# Patient Record
(demographics unavailable — no encounter records)

---

## 2025-03-26 NOTE — HEALTH RISK ASSESSMENT
[Very Good] : ~his/her~  mood as very good [Yes] : Yes [2 - 3 times a week (3 pts)] : 2 - 3  times a week (3 points) [1 or 2 (0 pts)] : 1 or 2 (0 points) [No falls in past year] : Patient reported no falls in the past year [Little interest or pleasure doing things] : 1) Little interest or pleasure doing things [Feeling down, depressed, or hopeless] : 2) Feeling down, depressed, or hopeless [0] : 2) Feeling down, depressed, or hopeless: Not at all (0) [PHQ-2 Negative - No further assessment needed] : PHQ-2 Negative - No further assessment needed [Time Spent: ___ Minutes] : I spent [unfilled] minutes performing a depression screening for this patient. [Former] : Former [> 15 Years] : > 15 Years [[id number]] : 0

## 2025-03-26 NOTE — HISTORY OF PRESENT ILLNESS
[de-identified] : 71 yr old w HTN, HLD, had CACS w her cardiologist Dr Bryant a year ago; here for CPE.  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  He is doing better, but short term memory is affected.  She walks regularly and Pilates 3 days a week, unlimited by CP sx.  She declines flu shots. has declined pnemovax for years. Had discussion again today. she will reconsider getting PCV 20.   4/3/2024. 70 yr old w HTN and HLD here in f/u. Back on statin and tolerating fine, needs labs. Seeing Dr Bryant, her cardiologist, who arranged for a coronary calc score today.  2/6/2024  70 yr old here in f/u HTN and HLD. Stopped statin for a while, not sure it that was what was causing her malaise and will now restart. Brings record of home BP all normal. High in office only. Had great brooke in [place] and [place]. Feels well.

## 2025-03-26 NOTE — HISTORY OF PRESENT ILLNESS
[de-identified] : 71 yr old w HTN, HLD, had CACS w her cardiologist Dr Bryant a year ago; here for CPE.  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  He is doing better, but short term memory is affected.  She walks regularly and Pilates 3 days a week, unlimited by CP sx.  She declines flu shots. has declined pnemovax for years. Had discussion again today. she will reconsider getting PCV 20.   4/3/2024. 70 yr old w HTN and HLD here in f/u. Back on statin and tolerating fine, needs labs. Seeing Dr Bryant, her cardiologist, who arranged for a coronary calc score today.  2/6/2024  70 yr old here in f/u HTN and HLD. Stopped statin for a while, not sure it that was what was causing her malaise and will now restart. Brings record of home BP all normal. High in office only. Had great brooke in [place] and [place]. Feels well.

## 2025-03-26 NOTE — ASSESSMENT
[FreeTextEntry1] : Patient provided with education on recommended exercise, proper diet, recommended age appropriate screening tests, vaccines, sexual health, smoking cessation and moderate alcohol intake.  STI screening offered including HIV/ Hep C check; proper use of seat belts, helmets on bicycles, also recommended.  Doing well  worried about her 's health  decrease wine intake  cont exercise/ Pilates  reconsider getting PCV 20.  f/u w cardiologist Dr Bryant annually mild pulm htn unlikely to be of consequence for her f/u 1y /prn

## 2025-03-26 NOTE — HEALTH RISK ASSESSMENT
[Very Good] : ~his/her~  mood as very good [Yes] : Yes [2 - 3 times a week (3 pts)] : 2 - 3  times a week (3 points) [1 or 2 (0 pts)] : 1 or 2 (0 points) [No falls in past year] : Patient reported no falls in the past year [Little interest or pleasure doing things] : 1) Little interest or pleasure doing things [Feeling down, depressed, or hopeless] : 2) Feeling down, depressed, or hopeless [0] : 2) Feeling down, depressed, or hopeless: Not at all (0) [PHQ-2 Negative - No further assessment needed] : PHQ-2 Negative - No further assessment needed [Time Spent: ___ Minutes] : I spent [unfilled] minutes performing a depression screening for this patient. [Former] : Former [> 15 Years] : > 15 Years [[id number]] : 0

## 2025-03-26 NOTE — ADDENDUM
[FreeTextEntry1] :  NOTE: CHARGE FOR VISIT AMENDED TO F/U VISIT LEVEL 5  45 MINUTES MEDICARE ANNUAL VISIT ALREADY BILLED BY ANOTHER PROVIDER.

## 2025-05-28 NOTE — END OF VISIT
[FreeTextEntry3] : Documented by Corine Neumann acting as a scribe for Dr. Marquez Benitez. 05/28/2025  All medical record entries made by the Scribe were at my, Dr. Marquez Benitez, direction and personally dictated by me on 05/28/2025. I have reviewed the chart and agree that the record accurately reflects my personal performance of the history, physical exam, assessment and plan. I have also personally directed, reviewed, and agreed with the chart.

## 2025-05-28 NOTE — DISCUSSION/SUMMARY
[de-identified] : IMP: 71-year-old female with bilateral hip osteoarthritis left worst than right; possible osteonecrosis, as well as suspected left lateral meniscus tear. - We will initiate a new course of onservative management with physical therapy, home exercise program, Tylenol, and topical diclofenac as needed.  - Patient expressed some reservations regarding oral anti-inflammatories, and so we will avoid these for the time being.  - Her hip symptoms have overall been stable and relatively mild.  - Her knee symptoms are only three weeks old and have been spontaneously improving on their own.  - We did review that further workup could be considered to consider a partial lateral meniscectomy, but only after a failure of initial conservative management; and overall, I doubt that she'll need an arthroscopy. - However, we will offer her follow-up in six to eight weeks with no new x-rays needed to review clinical progress.  - If she's had insufficient improvement in her knee symptoms by then, we may consider a left knee MRI.  - We did review that she would be a candidate for a left total hip replacement if her hip symptoms become more severe, which to date for the past several years they have not.

## 2025-05-28 NOTE — PHYSICAL EXAM
[de-identified] : General appearance: well nourished and hydrated, pleasant, alert and oriented x 3, cooperative.   HEENT: normocephalic, EOM intact, wearing mask, external auditory canal clear.   Cardiovascular: no lower leg edema, no varicosities, dorsalis pedis pulses palpable and symmetric.   Lymphatics: no palpable lymphadenopathy, no lymphedema.   Neurologic: sensation is normal, no muscle weakness in upper or lower extremities, patella tendon reflexes present and symmetric.   Dermatologic: skin moist, warm, no rash.   Spine: cervical spine with normal lordosis and painless range of motion, thoracic spine with normal kyphosis and painless range of motion, lumbosacral spine with normal lordosis and painless range of motion.  No tenderness to palpation along midline spine and paraspinal musculature.  Sacroiliac joints nontender bilaterally. Negative SLR and crossed SLR tests bilaterally. Gait: She presents with no assistive device, she transitions cautiously from seated to standing, and she demonstrates a cautious gait pattern. She does have some shoulder asymmetry and a left alston tilt of her pelvis.  Left knee:  - Focal soft tissue swelling: none - Baker cyst: No palpable Baker's cysts - Ecchymosis: none - Erythema: none - Effusion: none - Wounds: none - Alignment: normal - Tenderness: She has focal tenderness to palpation at the lateral joint line, she's non tender to palpation medially, she's non-tender to palpation through the extensive mechanism; Gerdy's tubercle itself is non tender to palpation. She is non tender to palpation at the lateral epicondyle. She's non-tender to palpation at the fibular neck and head.  - ROM: 0-140; no pain on terminal flexion or extension. - Collateral laxity: stable - Cruciate laxity: stable - Popliteal angle (degrees): 15 - Quad strength: 4+/5; effort limited by pain. - Extensor lag: none - Alessia: Painful - Aldo: Negative.  Limb lengths: clinically equal  Left hip: - Focal soft tissue swelling: none - Ecchymosis: none - Erythema: none - Wounds:  none - Tenderness: none - ROM:    - Flexion: 90   - Extension: 0   - Adduction: 15   - Abduction: 20   - Internal rotation in 90 degrees of hip flexion: 5   - External rotation in 90 degrees of hip flexion: 15 - CHASE: stiff but painless - FADIR: stiff but painless - Raphael: negative - Stinchfield: positive - Flexor power: 4+/5 - Abductor power: 5/5  Right hip:  - Focal soft tissue swelling: none - Ecchymosis: none - Erythema: none - Wounds:  none - Tenderness: none - ROM:    - Flexion: 95   - Extension: 0   - Adduction: 10   - Abduction: 20   - Internal rotation in 90 degrees of hip flexion: 10   - External rotation in 90 degrees of hip flexion: 15 - CHASE: stiff but painless - FADIR: stiff but painless - Raphael: negative - Stinchfield: negative - Flexor power: 5/5 - Abductor power: 5/5 [de-identified] : Ap pelvis, left hip, and left knee X-rays were taken today. These demonstrate normal pelvic alignment. Rght hip demonstrates moderate osteoarthritis with predominantly inferior and medial joint space narrowing, TONNIS 2, there is no associated deformity. Poor radiographic evidence of primary osteonecrosis. Left hip demonstrates severe arthritis with bone-on-bone inferior medial articulation, TONNIS 3. There is no associated deformity, there are extensive sclerotic analytic changes within the femoral head, likely consistent with primary osteonecrosis. Left knee demonstrates normal alignment, no arthritis, patella sits at normal height, and tracks centrally.

## 2025-05-28 NOTE — HISTORY OF PRESENT ILLNESS
[de-identified] : 05/28/2025: 71 year-old female, who presents with left hip osteoarthritis as well as left knee possible lateral meniscus tear. Patient reports that about three weeks ago she started to experience swelling in the knee as well as left lateral knee pain, mainly when going up from seated to standing. It states that the pain at this point is only mild. It was worse three weeks ago, so it's been improving significantly. At that time, she did note that she had some swelling in her knee. She has taken Tylenol for the pain, which has helped to a degree. Currently, she's only really getting symptoms after standing from seated for a long period of time. She's not done any physical therapy or anti-inflammatory medications this time. Patient specifically indicates the left lateral joint line as her primary point of maximal pain.  7/12/21: 67y/o female ref by Dr. Perez for evaluation of left hip arthritis. She reports episodic discomfort that seems to flit from the left hip to the left knee and back, present since 2018. No injury. Symptoms are overall mild to moderate and have never limited her exercise tolerance. Feels that she can walk unlimited distances, does Pilates and other self-directed cardio about 3x weekly. Has only needed to use Advil a handful of time over the years. Has not attempted any other treatment. She has been seeing Dr. 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 She is here to see whether [place] is necessary.    The patient mentions the symptoms started 3 year(s) ago. Pain levels include a current pain level of 4/10 and describes throbbing and achy pain.  She states the symptoms seem to be intermittent.    Modifying factors - worsened by bending.  and lying down. Relieving factors include relieved by heat.

## 2025-07-13 NOTE — DISCUSSION/SUMMARY
[de-identified] : IMP: 72 year old female with bilateral hip osteoarthritis and at this time, clinically healed left lateral meniscus tear. - She's back to her usual state of health.  - She's fully functional.  - The left hip is stiff but not painful at all, and does not limit her in any way.  - I recommend that she finish what remains of her physical therapy and continue with the home exercise program thereafter.  - She may follow up with me as needed. Would be an excellent candidate for bilateral total hip replacements if the hip symptoms become more bothersome.

## 2025-07-13 NOTE — HISTORY OF PRESENT ILLNESS
[de-identified] : 07/11/2025: 72-year-old female following up for bilateral hip osteoarthritis and suspected left knee lateral meniscus tear. We last saw her about 6 weeks ago and indicated her for conservative management given ongoing complete lack of pain in her hips and improving pain in her left knee. She reports that since then she's been participating in physical therapy as well as Pilates. She reports that the left knee pain has continued to subside and the left knee is basically essentially back to normal at this time. She's continuing to ambulate without the use of any assistive devices.   05/28/2025: 71 year-old female, who presents with left hip osteoarthritis as well as left knee possible lateral meniscus tear. Patient reports that about three weeks ago she started to experience swelling in the knee as well as left lateral knee pain, mainly when going up from seated to standing. It states that the pain at this point is only mild. It was worse three weeks ago, so it's been improving significantly. At that time, she did note that she had some swelling in her knee. She has taken Tylenol for the pain, which has helped to a degree. Currently, she's only really getting symptoms after standing from seated for a long period of time. She's not done any physical therapy or anti-inflammatory medications this time. Patient specifically indicates the left lateral joint line as her primary point of maximal pain.  7/12/21: 67y/o female ref by Dr. Perez for evaluation of left hip arthritis. She reports episodic discomfort that seems to flit from the left hip to the left knee and back, present since 2018. No injury. Symptoms are overall mild to moderate and have never limited her exercise tolerance. Feels that she can walk unlimited distances, does Pilates and other self-directed cardio about 3x weekly. Has only needed to use Advil a handful of time over the years. Has not attempted any other treatment. She has been seeing Dr. 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 She is here to see whether [place] is necessary.   The patient mentions the symptoms started 3 year(s) ago. Pain levels include a current pain level of 4/10 and describes throbbing and achy pain.  She states the symptoms seem to be intermittent.  Modifying factors - worsened by bending. and lying down. Relieving factors include relieved by heat.

## 2025-07-13 NOTE — END OF VISIT
[FreeTextEntry3] : Documented by Corine Neumann acting as a scribe for Dr. Marquez Benitez. 07/11/2025  All medical record entries made by the Scribe were at my, Dr. Marquez Benitez, direction and personally dictated by me on 07/11/2025. I have reviewed the chart and agree that the record accurately reflects my personal performance of the history, physical exam, assessment and plan. I have also personally directed, reviewed, and agreed with the chart. 
[FreeTextEntry3] : Documented by Corine Neumann acting as a scribe for Dr. Marquez Benitez. 07/11/2025  All medical record entries made by the Scribe were at my, Dr. Marquez Benitez, direction and personally dictated by me on 07/11/2025. I have reviewed the chart and agree that the record accurately reflects my personal performance of the history, physical exam, assessment and plan. I have also personally directed, reviewed, and agreed with the chart. 
No

## 2025-07-13 NOTE — PHYSICAL EXAM
[de-identified] : General appearance: well nourished and hydrated, pleasant, alert and oriented x 3, cooperative.   HEENT: normocephalic, EOM intact, wearing mask, external auditory canal clear.   Cardiovascular: no lower leg edema, no varicosities, dorsalis pedis pulses palpable and symmetric.   Lymphatics: no palpable lymphadenopathy, no lymphedema.   Neurologic: sensation is normal, no muscle weakness in upper or lower extremities, patella tendon reflexes present and symmetric.   Dermatologic: skin moist, warm, no rash.   Spine: cervical spine with normal lordosis and painless range of motion, thoracic spine with normal kyphosis and painless range of motion, lumbosacral spine with normal lordosis and painless range of motion.  No tenderness to palpation along midline spine and paraspinal musculature.  Sacroiliac joints nontender bilaterally. Negative SLR and crossed SLR tests bilaterally. Gait: She presents today with no assistive device, She transitions easily from seated to standing. She demonstrates a stable non antalgic gait pattern.  Left knee:  - Focal soft tissue swelling: none - Baker cyst: No palpable Baker's cysts - Ecchymosis: none - Erythema: none - Effusion: none - Wounds: none - Alignment: normal - Tenderness: Medial and lateral joint line is non tender to palpation. Negative patellar compression test. - ROM: 0-140; no paion terminal flexion or extension. - Collateral laxity: stable - Cruciate laxity: stable - Popliteal angle (degrees):  - Quad strength:  - Extensor lag: none - Alessia: Negative - Aldo: Negative  Limb lengths: clinically equal  Left hip: - Focal soft tissue swelling: none - Ecchymosis: none - Erythema: none - Wounds: none - Tenderness: none - ROM:    - Flexion: 90   - Extension: 0   - Adduction: 5   - Abduction: 15   - Internal rotation in 90 degrees of hip flexion: 5   - External rotation in 90 degrees of hip flexion: 15 - CHASE: stiff but painless - FADIR: stiff but painless - Raphael: negative - Stinchfield: negative - Flexor power: 5/5 - Abductor power: 5/5

## 2025-07-13 NOTE — HISTORY OF PRESENT ILLNESS
[de-identified] : 07/11/2025: 72-year-old female following up for bilateral hip osteoarthritis and suspected left knee lateral meniscus tear. We last saw her about 6 weeks ago and indicated her for conservative management given ongoing complete lack of pain in her hips and improving pain in her left knee. She reports that since then she's been participating in physical therapy as well as Pilates. She reports that the left knee pain has continued to subside and the left knee is basically essentially back to normal at this time. She's continuing to ambulate without the use of any assistive devices.   05/28/2025: 71 year-old female, who presents with left hip osteoarthritis as well as left knee possible lateral meniscus tear. Patient reports that about three weeks ago she started to experience swelling in the knee as well as left lateral knee pain, mainly when going up from seated to standing. It states that the pain at this point is only mild. It was worse three weeks ago, so it's been improving significantly. At that time, she did note that she had some swelling in her knee. She has taken Tylenol for the pain, which has helped to a degree. Currently, she's only really getting symptoms after standing from seated for a long period of time. She's not done any physical therapy or anti-inflammatory medications this time. Patient specifically indicates the left lateral joint line as her primary point of maximal pain.  7/12/21: 67y/o female ref by Dr. Perez for evaluation of left hip arthritis. She reports episodic discomfort that seems to flit from the left hip to the left knee and back, present since 2018. No injury. Symptoms are overall mild to moderate and have never limited her exercise tolerance. Feels that she can walk unlimited distances, does Pilates and other self-directed cardio about 3x weekly. Has only needed to use Advil a handful of time over the years. Has not attempted any other treatment. She has been seeing Dr. 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 She is here to see whether [place] is necessary.   The patient mentions the symptoms started 3 year(s) ago. Pain levels include a current pain level of 4/10 and describes throbbing and achy pain.  She states the symptoms seem to be intermittent.  Modifying factors - worsened by bending. and lying down. Relieving factors include relieved by heat.

## 2025-07-13 NOTE — DISCUSSION/SUMMARY
[de-identified] : IMP: 72 year old female with bilateral hip osteoarthritis and at this time, clinically healed left lateral meniscus tear. - She's back to her usual state of health.  - She's fully functional.  - The left hip is stiff but not painful at all, and does not limit her in any way.  - I recommend that she finish what remains of her physical therapy and continue with the home exercise program thereafter.  - She may follow up with me as needed. Would be an excellent candidate for bilateral total hip replacements if the hip symptoms become more bothersome.

## 2025-07-13 NOTE — PHYSICAL EXAM
[de-identified] : General appearance: well nourished and hydrated, pleasant, alert and oriented x 3, cooperative.   HEENT: normocephalic, EOM intact, wearing mask, external auditory canal clear.   Cardiovascular: no lower leg edema, no varicosities, dorsalis pedis pulses palpable and symmetric.   Lymphatics: no palpable lymphadenopathy, no lymphedema.   Neurologic: sensation is normal, no muscle weakness in upper or lower extremities, patella tendon reflexes present and symmetric.   Dermatologic: skin moist, warm, no rash.   Spine: cervical spine with normal lordosis and painless range of motion, thoracic spine with normal kyphosis and painless range of motion, lumbosacral spine with normal lordosis and painless range of motion.  No tenderness to palpation along midline spine and paraspinal musculature.  Sacroiliac joints nontender bilaterally. Negative SLR and crossed SLR tests bilaterally. Gait: She presents today with no assistive device, She transitions easily from seated to standing. She demonstrates a stable non antalgic gait pattern.  Left knee:  - Focal soft tissue swelling: none - Baker cyst: No palpable Baker's cysts - Ecchymosis: none - Erythema: none - Effusion: none - Wounds: none - Alignment: normal - Tenderness: Medial and lateral joint line is non tender to palpation. Negative patellar compression test. - ROM: 0-140; no paion terminal flexion or extension. - Collateral laxity: stable - Cruciate laxity: stable - Popliteal angle (degrees):  - Quad strength:  - Extensor lag: none - Alessia: Negative - Aldo: Negative  Limb lengths: clinically equal  Left hip: - Focal soft tissue swelling: none - Ecchymosis: none - Erythema: none - Wounds: none - Tenderness: none - ROM:    - Flexion: 90   - Extension: 0   - Adduction: 5   - Abduction: 15   - Internal rotation in 90 degrees of hip flexion: 5   - External rotation in 90 degrees of hip flexion: 15 - CHASE: stiff but painless - FADIR: stiff but painless - Raphael: negative - Stinchfield: negative - Flexor power: 5/5 - Abductor power: 5/5